# Patient Record
Sex: FEMALE | Race: WHITE
[De-identification: names, ages, dates, MRNs, and addresses within clinical notes are randomized per-mention and may not be internally consistent; named-entity substitution may affect disease eponyms.]

---

## 2019-10-17 ENCOUNTER — HOSPITAL ENCOUNTER (OUTPATIENT)
Dept: HOSPITAL 53 - M WHC | Age: 49
End: 2019-10-17
Attending: FAMILY MEDICINE
Payer: COMMERCIAL

## 2019-10-17 DIAGNOSIS — Z12.31: Primary | ICD-10-CM

## 2019-10-17 DIAGNOSIS — Z80.3: ICD-10-CM

## 2019-10-25 NOTE — REPMRS
Patient History

The patient states she has not had a clinical breast exam in over

a year.

Patient is postmenopausal.

Family history of breast cancer at age 50 in maternal aunt.

No Hormone Replacement Therapy

 

3D TOMOSYNTHESIS WAS PERFORMED.

 

The [facility] lifetime risk for breast cancer is 10.0%.

 

Digital Woman Screen Mammo: October 17, 2019 - Exam #: 

[id number]

Bilateral CC and MLO view(s) were taken.

 

Technologist: Michelle Domingo, Technologist

Prior study comparison: 2014, bilateral digital mammo screening 

bilat, performed at [facility].

 

FINDINGS: There are scattered fibroglandular densities.  There 

has been no change in the appearance of the mammogram from the 

prior studies.  There is a mild amount of residual fibroglandular

tissue which is fairly symmetric. There is no interval 

development of dominant mass, architectural distortion, or 

clustered microcalcification suggestive of malignancy.

 

Assessment: BI-RADS/ACR category 1 mammogram. Negative Mammogram.

 

Recommendation

Routine screening mammogram in 1 year (for women over age 40).

This mammogram was interpreted with the aid of an FDA-approved 

computer-aided dectection system.

 

Electronically Signed By: Micheal Knox MD 10/25/19 [id number]

## 2020-02-19 ENCOUNTER — HOSPITAL ENCOUNTER (OUTPATIENT)
Dept: HOSPITAL 53 - M CLY | Age: 50
End: 2020-02-19
Attending: NURSE PRACTITIONER
Payer: MEDICARE

## 2020-02-19 DIAGNOSIS — R51: Primary | ICD-10-CM

## 2020-02-19 NOTE — REP
Clinical:  Headache.  Posterior palpable mass.

 

Technique:  AP and lateral soft tissue neck radiographs.

 

Findings:

Visualized cervical spine appears relatively normal without acute fracture /

compression injury or subluxation.  The airway is patent, midline and normal.

The surrounding soft tissues are unremarkable.

 

Impression:

Normal soft tissue neck radiographs.

 

 

Electronically Signed by

James Hoskins MD 02/19/2020 03:32 P

## 2020-02-19 NOTE — REP
Clinical:  Headache.

 

Technique:  AP, lateral, flexion/extension, bilateral oblique and open mouth

views of the cervical spine.

 

Findings:  Alignment is maintained.  No acute fracture / compression injury or

subluxation.  The disc spaces are relatively normal.  No significant

osteophytosis or facet arthropathy is appreciated.  Spinous processes are intact.

Prevertebral soft tissues are normal.  Open mouth view demonstrates normal C1-C2

articulation and odontoid process.  Oblique views demonstrate patent neural

foramen.

 

Impression:  Age-appropriate cervical spine radiographs.

 

 

Electronically Signed by

James Hoskins MD 02/19/2020 03:33 P

## 2020-02-19 NOTE — REP
Clinical:  Headaches.  Palpable mass.

 

Technique:  Negra Lowe, AP, and bilateral lateral views of the skull.

 

Findings:

Calvarium and overlying soft tissues appear normal.  Sinuses are clear.

 

Impression:

Normal skull radiographs.

 

 

Electronically Signed by

James Hoskins MD 02/19/2020 03:31 P

## 2022-06-13 ENCOUNTER — HOSPITAL ENCOUNTER (OUTPATIENT)
Dept: HOSPITAL 53 - M SFHCCLAY | Age: 52
End: 2022-06-13
Payer: MEDICARE

## 2022-06-13 DIAGNOSIS — E78.5: Primary | ICD-10-CM

## 2022-06-13 LAB
ALBUMIN SERPL BCG-MCNC: 4.6 GM/DL (ref 3.2–5.2)
ALT SERPL W P-5'-P-CCNC: 14 U/L (ref 12–78)
BILIRUB SERPL-MCNC: 0.8 MG/DL (ref 0.2–1)
BUN SERPL-MCNC: 9 MG/DL (ref 7–18)
CALCIUM SERPL-MCNC: 10.1 MG/DL (ref 8.5–10.1)
CHLORIDE SERPL-SCNC: 102 MEQ/L (ref 98–107)
CHOLEST SERPL-MCNC: 237 MG/DL (ref ?–200)
CHOLEST/HDLC SERPL: 3.12 {RATIO} (ref ?–5)
CO2 SERPL-SCNC: 32 MEQ/L (ref 21–32)
CREAT SERPL-MCNC: 0.63 MG/DL (ref 0.55–1.3)
GFR SERPL CREATININE-BSD FRML MDRD: > 60 ML/MIN/{1.73_M2} (ref 51–?)
GLUCOSE SERPL-MCNC: 96 MG/DL (ref 70–100)
HCT VFR BLD AUTO: 42.3 % (ref 36–47)
HDLC SERPL-MCNC: 76 MG/DL (ref 40–?)
HGB BLD-MCNC: 13.6 G/DL (ref 12–15.5)
LDLC SERPL CALC-MCNC: 131 MG/DL (ref ?–100)
MCH RBC QN AUTO: 30 PG (ref 27–33)
MCHC RBC AUTO-ENTMCNC: 32.2 G/DL (ref 32–36.5)
MCV RBC AUTO: 93.4 FL (ref 80–96)
NONHDLC SERPL-MCNC: 161 MG/DL
PLATELET # BLD AUTO: 241 10^3/UL (ref 150–450)
POTASSIUM SERPL-SCNC: 4.7 MEQ/L (ref 3.5–5.1)
PROT SERPL-MCNC: 7.6 GM/DL (ref 6.4–8.2)
RBC # BLD AUTO: 4.53 10^6/UL (ref 4–5.4)
SODIUM SERPL-SCNC: 140 MEQ/L (ref 136–145)
TRIGL SERPL-MCNC: 151 MG/DL (ref ?–150)
WBC # BLD AUTO: 5.4 10^3/UL (ref 4–10)

## 2022-07-13 ENCOUNTER — HOSPITAL ENCOUNTER (OUTPATIENT)
Dept: HOSPITAL 53 - M SOG | Age: 52
End: 2022-07-13
Attending: ORTHOPAEDIC SURGERY
Payer: MEDICARE

## 2022-07-13 DIAGNOSIS — M25.861: ICD-10-CM

## 2022-07-13 DIAGNOSIS — M25.562: ICD-10-CM

## 2022-07-13 DIAGNOSIS — M25.561: Primary | ICD-10-CM

## 2022-07-13 DIAGNOSIS — M25.862: ICD-10-CM

## 2022-09-09 ENCOUNTER — HOSPITAL ENCOUNTER (OUTPATIENT)
Dept: HOSPITAL 53 - M SFHCCLAY | Age: 52
End: 2022-09-09
Payer: MEDICARE

## 2022-09-09 DIAGNOSIS — R30.0: Primary | ICD-10-CM

## 2022-10-18 ENCOUNTER — HOSPITAL ENCOUNTER (OUTPATIENT)
Dept: HOSPITAL 53 - M SFHCWAGY | Age: 52
End: 2022-10-18
Attending: OBSTETRICS & GYNECOLOGY
Payer: MEDICARE

## 2022-10-18 DIAGNOSIS — Z12.4: Primary | ICD-10-CM

## 2022-10-18 PROCEDURE — 87624 HPV HI-RISK TYP POOLED RSLT: CPT

## 2022-11-07 ENCOUNTER — HOSPITAL ENCOUNTER (OUTPATIENT)
Dept: HOSPITAL 53 - M WHC | Age: 52
End: 2022-11-07
Attending: OBSTETRICS & GYNECOLOGY
Payer: MEDICARE

## 2022-11-07 DIAGNOSIS — N95.0: Primary | ICD-10-CM

## 2023-03-14 ENCOUNTER — HOSPITAL ENCOUNTER (OUTPATIENT)
Dept: HOSPITAL 53 - M RAD | Age: 53
End: 2023-03-14
Attending: NEUROLOGICAL SURGERY
Payer: MEDICARE

## 2023-03-14 DIAGNOSIS — Z96.89: ICD-10-CM

## 2023-03-14 DIAGNOSIS — Z98.1: ICD-10-CM
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
## 2023-03-14 DIAGNOSIS — Z98.890: Primary | ICD-10-CM

## 2023-03-14 DIAGNOSIS — M51.26: ICD-10-CM

## 2023-03-14 DIAGNOSIS — T84.216A: ICD-10-CM

## 2023-03-22 ENCOUNTER — HOSPITAL ENCOUNTER (OUTPATIENT)
Dept: HOSPITAL 53 - M LABDRAWC | Age: 53
End: 2023-03-22
Attending: PHYSICIAN ASSISTANT
Payer: MEDICARE

## 2023-03-22 DIAGNOSIS — Z98.890: ICD-10-CM

## 2023-03-22 DIAGNOSIS — Z79.899: ICD-10-CM

## 2023-03-22 DIAGNOSIS — G89.4: Primary | ICD-10-CM

## 2023-03-22 LAB
APPEARANCE UR: CLEAR
BACTERIA UR QL AUTO: NEGATIVE
BASOPHILS # BLD AUTO: 0 10^3/UL (ref 0–0.2)
BASOPHILS NFR BLD AUTO: 0.9 % (ref 0–1)
BILIRUB UR QL STRIP.AUTO: NEGATIVE
BUN SERPL-MCNC: 9 MG/DL (ref 9–23)
CALCIUM SERPL-MCNC: 9.4 MG/DL (ref 8.5–10.1)
CHLORIDE SERPL-SCNC: 101 MMOL/L (ref 98–107)
CO2 SERPL-SCNC: 33 MMOL/L (ref 20–31)
CREAT SERPL-MCNC: 0.5 MG/DL (ref 0.55–1.3)
EOSINOPHIL # BLD AUTO: 0.1 10^3/UL (ref 0–0.5)
EOSINOPHIL NFR BLD AUTO: 2.8 % (ref 0–3)
GFR SERPL CREATININE-BSD FRML MDRD: > 60 ML/MIN/{1.73_M2} (ref 51–?)
GLUCOSE SERPL-MCNC: 92 MG/DL (ref 60–100)
GLUCOSE UR QL STRIP.AUTO: NEGATIVE MG/DL
HCT VFR BLD AUTO: 39.8 % (ref 36–47)
HGB BLD-MCNC: 13 G/DL (ref 12–15.5)
HGB UR QL STRIP.AUTO: NEGATIVE
KETONES UR QL STRIP.AUTO: NEGATIVE MG/DL
LEUKOCYTE ESTERASE UR QL STRIP.AUTO: NEGATIVE
LYMPHOCYTES # BLD AUTO: 1.6 10^3/UL (ref 1.5–5)
LYMPHOCYTES NFR BLD AUTO: 38.3 % (ref 24–44)
MCH RBC QN AUTO: 30.7 PG (ref 27–33)
MCHC RBC AUTO-ENTMCNC: 32.7 G/DL (ref 32–36.5)
MCV RBC AUTO: 93.9 FL (ref 80–96)
MONOCYTES # BLD AUTO: 0.3 10^3/UL (ref 0–0.8)
MONOCYTES NFR BLD AUTO: 8 % (ref 2–8)
NEUTROPHILS # BLD AUTO: 2.1 10^3/UL (ref 1.5–8.5)
NEUTROPHILS NFR BLD AUTO: 49.5 % (ref 36–66)
NITRITE UR QL STRIP.AUTO: NEGATIVE
PH UR STRIP.AUTO: 8 UNITS (ref 5–9)
PLATELET # BLD AUTO: 277 10^3/UL (ref 150–450)
POTASSIUM SERPL-SCNC: 4.6 MMOL/L (ref 3.5–5.1)
PROT UR QL STRIP.AUTO: NEGATIVE MG/DL
RBC # BLD AUTO: 4.24 10^6/UL (ref 4–5.4)
RBC # UR AUTO: 0 /HPF (ref 0–3)
SODIUM SERPL-SCNC: 140 MMOL/L (ref 136–145)
SP GR UR STRIP.AUTO: 1 (ref 1–1.03)
SQUAMOUS #/AREA URNS AUTO: 0 /HPF (ref 0–6)
UROBILINOGEN UR QL STRIP.AUTO: 0.2 MG/DL (ref 0–2)
WBC # BLD AUTO: 4.3 10^3/UL (ref 4–10)
WBC #/AREA URNS AUTO: 0 /HPF (ref 0–3)

## 2023-08-29 ENCOUNTER — HOSPITAL ENCOUNTER (OUTPATIENT)
Dept: HOSPITAL 53 - M LABDRAWC | Age: 53
End: 2023-08-29
Payer: MEDICARE

## 2023-08-29 DIAGNOSIS — L40.4: Primary | ICD-10-CM

## 2023-08-29 LAB
BASOPHILS # BLD AUTO: 0.1 10^3/UL (ref 0–0.2)
BASOPHILS NFR BLD AUTO: 1.3 % (ref 0–1)
CHOLEST SERPL-MCNC: 216 MG/DL (ref ?–200)
EOSINOPHIL # BLD AUTO: 0.1 10^3/UL (ref 0–0.5)
EOSINOPHIL NFR BLD AUTO: 2.5 % (ref 0–3)
HCT VFR BLD AUTO: 37 % (ref 36–47)
HGB BLD-MCNC: 12 G/DL (ref 12–15.5)
LYMPHOCYTES # BLD AUTO: 1.7 10^3/UL (ref 1.5–5)
LYMPHOCYTES NFR BLD AUTO: 42.4 % (ref 24–44)
MCH RBC QN AUTO: 30.2 PG (ref 27–33)
MCHC RBC AUTO-ENTMCNC: 32.4 G/DL (ref 32–36.5)
MCV RBC AUTO: 93.2 FL (ref 80–96)
MONOCYTES # BLD AUTO: 0.3 10^3/UL (ref 0–0.8)
MONOCYTES NFR BLD AUTO: 8.6 % (ref 2–8)
NEUTROPHILS # BLD AUTO: 1.8 10^3/UL (ref 1.5–8.5)
NEUTROPHILS NFR BLD AUTO: 44.9 % (ref 36–66)
PLATELET # BLD AUTO: 291 10^3/UL (ref 150–450)
RBC # BLD AUTO: 3.97 10^6/UL (ref 4–5.4)
TRIGL SERPL-MCNC: 165 MG/DL (ref ?–150)
WBC # BLD AUTO: 4 10^3/UL (ref 4–10)